# Patient Record
Sex: MALE | Race: WHITE | NOT HISPANIC OR LATINO | Employment: OTHER | URBAN - METROPOLITAN AREA
[De-identification: names, ages, dates, MRNs, and addresses within clinical notes are randomized per-mention and may not be internally consistent; named-entity substitution may affect disease eponyms.]

---

## 2023-05-10 DIAGNOSIS — M79.642 BILATERAL HAND PAIN: Primary | ICD-10-CM

## 2023-05-10 DIAGNOSIS — M79.641 BILATERAL HAND PAIN: Primary | ICD-10-CM

## 2023-05-16 ENCOUNTER — HOSPITAL ENCOUNTER (OUTPATIENT)
Dept: RADIOLOGY | Facility: OTHER | Age: 73
Discharge: HOME | End: 2023-05-16
Attending: ORTHOPAEDIC SURGERY
Payer: MEDICARE

## 2023-05-16 ENCOUNTER — OFFICE VISIT (OUTPATIENT)
Dept: ORTHOPEDICS | Facility: CLINIC | Age: 73
End: 2023-05-16
Payer: MEDICARE

## 2023-05-16 VITALS — BODY MASS INDEX: 24.33 KG/M2 | HEIGHT: 67 IN | WEIGHT: 155 LBS

## 2023-05-16 DIAGNOSIS — M79.642 BILATERAL HAND PAIN: Primary | ICD-10-CM

## 2023-05-16 DIAGNOSIS — M79.641 BILATERAL HAND PAIN: Primary | ICD-10-CM

## 2023-05-16 DIAGNOSIS — M79.641 BILATERAL HAND PAIN: ICD-10-CM

## 2023-05-16 DIAGNOSIS — M79.642 BILATERAL HAND PAIN: ICD-10-CM

## 2023-05-16 PROCEDURE — 73130 X-RAY EXAM OF HAND: CPT | Mod: TC,50,FY

## 2023-05-16 PROCEDURE — 73130 X-RAY EXAM OF HAND: CPT | Mod: 26,50,, | Performed by: RADIOLOGY

## 2023-05-16 PROCEDURE — 99999 PR PBB SHADOW E&M-EST. PATIENT-LVL II: ICD-10-PCS | Mod: PBBFAC,,, | Performed by: ORTHOPAEDIC SURGERY

## 2023-05-16 PROCEDURE — 99204 PR OFFICE/OUTPT VISIT, NEW, LEVL IV, 45-59 MIN: ICD-10-PCS | Mod: S$PBB,,, | Performed by: ORTHOPAEDIC SURGERY

## 2023-05-16 PROCEDURE — 99212 OFFICE O/P EST SF 10 MIN: CPT | Mod: PBBFAC | Performed by: ORTHOPAEDIC SURGERY

## 2023-05-16 PROCEDURE — 99204 OFFICE O/P NEW MOD 45 MIN: CPT | Mod: S$PBB,,, | Performed by: ORTHOPAEDIC SURGERY

## 2023-05-16 PROCEDURE — 99999 PR PBB SHADOW E&M-EST. PATIENT-LVL II: CPT | Mod: PBBFAC,,, | Performed by: ORTHOPAEDIC SURGERY

## 2023-05-16 PROCEDURE — 73130 XR HAND COMPLETE 3 VIEWS BILATERAL: ICD-10-PCS | Mod: 26,50,, | Performed by: RADIOLOGY

## 2023-05-16 NOTE — PROGRESS NOTES
H&P  Orthopedics    SUBJECTIVE:     CC: left DIP joint pain of small finger    History of Present Illness:  Sanket Gresham is a 73 y.o. male who presents with left small finger pain when playing his guitar. Pain is mostly at the DIP joint of that finger. It does not hurt him every day. He gets relief with NSAIDs but does not like taking medications.       Review of patient's allergies indicates:  No Known Allergies    No past medical history on file.  No past surgical history on file.  No family history on file.        Review of Systems:  Patient denies constitutional symptoms, cardiac symptoms, respiratory symptoms, GI symptoms.  The remainder of the musculoskeletal ROS is included in the HPI.      OBJECTIVE:     Vital Signs (Most Recent)       Physical Exam:  Gen:  No acute distress  CV:  Peripherally well-perfused.  Pulses 2+ bilaterally.  Lungs:  Normal respiratory effort.  Abdomen:  Soft, non-tender, non-distended  Head/Neck:  Normocephalic.  Atraumatic. No TTP, AROM and PROM intact without pain  Neuro:  CN intact without deficit, SILT throughout B/L Upper & Lower Extremities    MSK:  LUE:  - TTP at small finger DIP joint  - AROM and PROM of the small finger full with DIP joint pain at extremes of motion   - AIN/PIN/Radial/Median/Ulnar Nerves assessed in isolation without deficit  - SILT throughout  - Radial & Ulnar arteries palpated 2+  - Capillary Refill <3s      Diagnostic Results:  Xray of the left hand  Impression:  No acute fractures, dislocations, or bony lesion. There is significant DIP joint degeneration with bone spur formation    EMG - none    ASSESSMENT/PLAN:     A/P: Sanket Gresham is a 73 y.o. male  with bilateral small finger DIP joint arthritis  Both surgical and non-surgical options were discussed with the patient today including NSAIDs, topical compound cream, therapy, injection, and surgical intervention. At this time, the patient wishes to proceed with continued NSAIDs. Anti-inflammatory diet  packet, paraffin wax therapy, and voltaren information given to patient today. Will follow up PRN

## 2023-05-16 NOTE — PROGRESS NOTES
I have personally taken the history and examined this patient. I agree with the resident's note as stated above.      A/P: Sanket Gresham is a 73 y.o. male  with bilateral small finger DIP joint arthritis  Both surgical and non-surgical options were discussed with the patient today including NSAIDs, topical compound cream, therapy, injection, and surgical intervention. At this time, the patient wishes to proceed with continued NSAIDs. Anti-inflammatory diet packet, paraffin wax therapy, and voltaren information given to patient today. Will follow up PRN